# Patient Record
Sex: MALE | URBAN - METROPOLITAN AREA
[De-identification: names, ages, dates, MRNs, and addresses within clinical notes are randomized per-mention and may not be internally consistent; named-entity substitution may affect disease eponyms.]

---

## 2017-02-06 ENCOUNTER — IMPORTED ENCOUNTER (OUTPATIENT)
Dept: URBAN - METROPOLITAN AREA CLINIC 43 | Facility: CLINIC | Age: 57
End: 2017-02-06

## 2018-04-26 ENCOUNTER — IMPORTED ENCOUNTER (OUTPATIENT)
Dept: URBAN - METROPOLITAN AREA CLINIC 43 | Facility: CLINIC | Age: 58
End: 2018-04-26

## 2018-04-26 PROBLEM — H25.13: Noted: 2018-04-26

## 2018-04-26 PROBLEM — H40.013: Noted: 2018-04-26

## 2018-11-08 NOTE — PATIENT DISCUSSION
The risks, benefits, and alternatives to treatment with Botox® were discussed. The patient elects to proceed.

## 2018-11-08 NOTE — PROCEDURE NOTE: CLINICAL
Prior to treatment, the risks/benefits/alternatives were discussed. The patient wished to proceed with procedure. Refer to template for location and amounts of injections. Botox was injected at each site without complications. Lot #: . LOT. Expiration Date: . EXP. Inventory Id: . INVID. Total Units Used: *. Total Units Wasted: *. Patient tolerated procedure well. There were no complications. Post procedure instructions given.

## 2019-05-22 ENCOUNTER — IMPORTED ENCOUNTER (OUTPATIENT)
Dept: URBAN - METROPOLITAN AREA CLINIC 43 | Facility: CLINIC | Age: 59
End: 2019-05-22

## 2019-05-22 PROBLEM — H25.13: Noted: 2019-05-22

## 2020-01-14 ENCOUNTER — EST. PATIENT EMERGENCY (OUTPATIENT)
Dept: URBAN - METROPOLITAN AREA CLINIC 32 | Facility: CLINIC | Age: 60
End: 2020-01-14

## 2020-01-14 DIAGNOSIS — H10.89: ICD-10-CM

## 2020-01-14 PROCEDURE — 92012 INTRM OPH EXAM EST PATIENT: CPT

## 2020-01-14 ASSESSMENT — VISUAL ACUITY
OS_CC: 20/20-2
OD_CC: 20/20

## 2020-01-14 ASSESSMENT — TONOMETRY
OS_IOP_MMHG: 11
OD_IOP_MMHG: 11

## 2020-01-21 ENCOUNTER — IOP CHECK (OUTPATIENT)
Dept: URBAN - METROPOLITAN AREA CLINIC 32 | Facility: CLINIC | Age: 60
End: 2020-01-21

## 2020-01-21 DIAGNOSIS — H20.012: ICD-10-CM

## 2020-01-21 PROCEDURE — 92012 INTRM OPH EXAM EST PATIENT: CPT

## 2020-01-21 ASSESSMENT — VISUAL ACUITY
OS_CC: 20/25
OD_CC: 20/25

## 2020-01-21 ASSESSMENT — TONOMETRY
OS_IOP_MMHG: 10
OD_IOP_MMHG: 12

## 2020-01-27 ENCOUNTER — FOLLOW UP (OUTPATIENT)
Dept: URBAN - METROPOLITAN AREA CLINIC 32 | Facility: CLINIC | Age: 60
End: 2020-01-27

## 2020-01-27 DIAGNOSIS — H20.012: ICD-10-CM

## 2020-01-27 PROCEDURE — 92012 INTRM OPH EXAM EST PATIENT: CPT

## 2020-01-27 ASSESSMENT — VISUAL ACUITY
OD_SC: 20/60
OS_SC: 20/30-1
OD_PH: 20/25
OS_PH: 20/25

## 2020-01-27 ASSESSMENT — TONOMETRY: OS_IOP_MMHG: 12

## 2020-02-03 ENCOUNTER — FOLLOW UP (OUTPATIENT)
Dept: URBAN - METROPOLITAN AREA CLINIC 32 | Facility: CLINIC | Age: 60
End: 2020-02-03

## 2020-02-03 DIAGNOSIS — H20.012: ICD-10-CM

## 2020-02-03 PROCEDURE — 99212 OFFICE O/P EST SF 10 MIN: CPT

## 2020-02-03 ASSESSMENT — VISUAL ACUITY
OD_PH: 20/30
OD_SC: 20/50
OS_SC: 20/30-2

## 2020-02-03 ASSESSMENT — TONOMETRY
OS_IOP_MMHG: 08
OD_IOP_MMHG: 11

## 2020-02-20 ENCOUNTER — FOLLOW UP (OUTPATIENT)
Dept: URBAN - METROPOLITAN AREA CLINIC 32 | Facility: CLINIC | Age: 60
End: 2020-02-20

## 2020-02-20 DIAGNOSIS — H20.012: ICD-10-CM

## 2020-02-20 PROCEDURE — 99212 OFFICE O/P EST SF 10 MIN: CPT

## 2020-02-20 ASSESSMENT — VISUAL ACUITY
OD_SC: 20/60
OS_SC: 20/50-1
OD_PH: 20/25
OS_PH: 20/25

## 2020-02-20 ASSESSMENT — TONOMETRY
OD_IOP_MMHG: 09
OS_IOP_MMHG: 08

## 2020-04-19 ASSESSMENT — TONOMETRY
OS_IOP_MMHG: 9.0
OS_IOP_MMHG: 10.0
OD_IOP_MMHG: 10.0
OD_IOP_MMHG: 14.0

## 2020-04-19 ASSESSMENT — VISUAL ACUITY
OD_OTHER: 20/50.
OS_CC: 20/20
OD_CC: J1+
OD_OTHER: 20/40.
OS_SC: J10
OD_CC: 20/20
OS_OTHER: 20/50.
OD_SC: J10
OD_SC: 20/60
OS_OTHER: 20/40.
OS_SC: 20/60
OS_CC: J1+

## 2020-04-19 ASSESSMENT — KERATOMETRY
OD_AXISANGLE_DEGREES: 54
OD_AXISANGLE_DEGREES: 72
OD_K1POWER_DIOPTERS: 44.5
OD_K2POWER_DIOPTERS: 44.75
OD_K2POWER_DIOPTERS: 45.25
OD_AXISANGLE2_DEGREES: 162
OS_AXISANGLE2_DEGREES: 28
OD_K1POWER_DIOPTERS: 44.75
OS_AXISANGLE2_DEGREES: 8
OS_K2POWER_DIOPTERS: 44.5
OD_AXISANGLE2_DEGREES: 144
OS_AXISANGLE_DEGREES: 118
OS_AXISANGLE_DEGREES: 98
OS_K1POWER_DIOPTERS: 44.25
OS_K2POWER_DIOPTERS: 45
OS_K1POWER_DIOPTERS: 44.5

## 2020-07-06 ENCOUNTER — CONTACT LENS EXAM ESTABLISHED (OUTPATIENT)
Dept: URBAN - METROPOLITAN AREA CLINIC 32 | Facility: CLINIC | Age: 60
End: 2020-07-06

## 2020-07-06 DIAGNOSIS — H25.13: ICD-10-CM

## 2020-07-06 PROCEDURE — 92310-1 LEVEL 1 CONTACT LENS MANAGEMENT

## 2020-07-06 PROCEDURE — 92014 COMPRE OPH EXAM EST PT 1/>: CPT

## 2020-07-06 ASSESSMENT — VISUAL ACUITY
OS_GLARE: 20/50
OD_SC: J10
OS_CC: J1+
OS_SC: J10
OS_SC: 20/60
OS_CC: 20/20
OD_GLARE: 20/50
OD_CC: J1+
OD_CC: 20/20
OD_SC: 20/60

## 2020-07-06 ASSESSMENT — KERATOMETRY
OS_AXISANGLE2_DEGREES: 124
OS_K2POWER_DIOPTERS: 44.75
OD_K2POWER_DIOPTERS: 45
OS_AXISANGLE_DEGREES: 34
OS_K1POWER_DIOPTERS: 44.5
OD_AXISANGLE2_DEGREES: 74
OD_AXISANGLE_DEGREES: 164
OD_K1POWER_DIOPTERS: 44.75

## 2020-07-06 ASSESSMENT — TONOMETRY
OD_IOP_MMHG: 10
OS_IOP_MMHG: 10

## 2021-06-08 ENCOUNTER — EST. PATIENT EMERGENCY (OUTPATIENT)
Dept: URBAN - METROPOLITAN AREA CLINIC 32 | Facility: CLINIC | Age: 61
End: 2021-06-08

## 2021-06-08 DIAGNOSIS — H20.022: ICD-10-CM

## 2021-06-08 PROCEDURE — 92012 INTRM OPH EXAM EST PATIENT: CPT

## 2021-06-08 RX ORDER — CYCLOPENTOLATE HYDROCHLORIDE 10 MG/ML
1 SOLUTION/ DROPS OPHTHALMIC TWICE A DAY
Start: 2020-07-06

## 2021-06-08 ASSESSMENT — KERATOMETRY
OS_K1POWER_DIOPTERS: 44.5
OD_AXISANGLE_DEGREES: 164
OD_K2POWER_DIOPTERS: 45
OS_AXISANGLE_DEGREES: 34
OD_AXISANGLE2_DEGREES: 74
OS_AXISANGLE2_DEGREES: 124
OS_K2POWER_DIOPTERS: 44.75
OD_K1POWER_DIOPTERS: 44.75

## 2021-06-08 ASSESSMENT — VISUAL ACUITY
OD_SC: 20/50
OS_SC: 20/50

## 2021-06-08 ASSESSMENT — TONOMETRY
OS_IOP_MMHG: 10
OD_IOP_MMHG: 12

## 2021-06-15 ENCOUNTER — IOP CHECK (OUTPATIENT)
Dept: URBAN - METROPOLITAN AREA CLINIC 32 | Facility: CLINIC | Age: 61
End: 2021-06-15

## 2021-06-15 DIAGNOSIS — H20.022: ICD-10-CM

## 2021-06-15 PROCEDURE — 99212 OFFICE O/P EST SF 10 MIN: CPT

## 2021-06-15 ASSESSMENT — VISUAL ACUITY
OD_PH: 20/25
OS_PH: 20/25-2
OD_SC: 20/50-2
OS_SC: 20/50-2

## 2021-06-15 ASSESSMENT — TONOMETRY
OD_IOP_MMHG: 12
OS_IOP_MMHG: 14

## 2021-06-15 ASSESSMENT — KERATOMETRY
OD_AXISANGLE2_DEGREES: 74
OS_AXISANGLE_DEGREES: 34
OS_K2POWER_DIOPTERS: 44.75
OD_K2POWER_DIOPTERS: 45
OD_AXISANGLE_DEGREES: 164
OS_K1POWER_DIOPTERS: 44.5
OS_AXISANGLE2_DEGREES: 124
OD_K1POWER_DIOPTERS: 44.75

## 2021-07-15 ENCOUNTER — CONTACT LENS EXAM ESTABLISHED (OUTPATIENT)
Dept: URBAN - METROPOLITAN AREA CLINIC 32 | Facility: CLINIC | Age: 61
End: 2021-07-15

## 2021-07-15 DIAGNOSIS — H25.13: ICD-10-CM

## 2021-07-15 DIAGNOSIS — H52.03: ICD-10-CM

## 2021-07-15 PROCEDURE — 92310-3N NEW CL PATIENT MULTIFOCAL AND/OR MONOVISION SOFT LENS EVALUATION

## 2021-07-15 PROCEDURE — 92015 DETERMINE REFRACTIVE STATE: CPT

## 2021-07-15 PROCEDURE — 92014 COMPRE OPH EXAM EST PT 1/>: CPT

## 2021-07-15 ASSESSMENT — KERATOMETRY
OS_K1POWER_DIOPTERS: 44.5
OS_AXISANGLE_DEGREES: 34
OD_K2POWER_DIOPTERS: 45
OS_AXISANGLE2_DEGREES: 124
OD_AXISANGLE2_DEGREES: 74
OD_AXISANGLE_DEGREES: 164
OS_K2POWER_DIOPTERS: 44.75
OD_K1POWER_DIOPTERS: 44.75

## 2021-07-15 ASSESSMENT — TONOMETRY
OD_IOP_MMHG: 11
OS_IOP_MMHG: 09

## 2021-07-15 ASSESSMENT — VISUAL ACUITY
OS_CC: 20/25
OS_CC: J1
OS_BAT: 20/50
OD_SC: 20/50
OD_BAT: 20/50
OD_CC: J1
OD_SC: J10
OD_CC: 20/25
OS_SC: 20/50
OS_SC: J10

## 2022-06-04 ENCOUNTER — TELEPHONE ENCOUNTER (OUTPATIENT)
Dept: URBAN - METROPOLITAN AREA CLINIC 68 | Facility: CLINIC | Age: 62
End: 2022-06-04

## 2022-06-04 RX ORDER — HYDROCORTISONE ACETATE 25 MG/1
SUPPOSITORY RECTAL
Qty: 60 | Refills: 0 | OUTPATIENT
Start: 2017-01-05 | End: 2017-01-26

## 2022-06-04 RX ORDER — BUDESONIDE 9 MG/1
TABLET, EXTENDED RELEASE ORAL DAILY
Qty: 30 | Refills: 0 | OUTPATIENT
Start: 2016-09-21 | End: 2016-10-21

## 2022-06-04 RX ORDER — MERCAPTOPURINE 50 MG/1
TABLET ORAL DAILY
Qty: 30 | Refills: 30 | OUTPATIENT
Start: 2016-12-27 | End: 2017-01-26

## 2022-06-04 RX ORDER — MESALAMINE 4 G/60ML
SUSPENSION RECTAL AS DIRECTED
Qty: 30 | Refills: 30 | OUTPATIENT
Start: 2016-06-06 | End: 2016-09-23

## 2022-06-04 RX ORDER — DIPHENOXYLATE HCL/ATROPINE 2.5-.025MG
TABLET ORAL
Qty: 60 | Refills: 0 | OUTPATIENT
Start: 2017-11-07 | End: 2017-12-07

## 2022-06-04 RX ORDER — MESALAMINE 1000 MG/1
SUPPOSITORY RECTAL DAILY
Qty: 30 | Refills: 30 | OUTPATIENT
Start: 2016-10-27 | End: 2017-11-06

## 2022-06-04 RX ORDER — BUDESONIDE 9 MG/1
TABLET, EXTENDED RELEASE ORAL DAILY
Qty: 90 | Refills: 0 | OUTPATIENT
Start: 2017-05-26 | End: 2017-08-24

## 2022-06-04 RX ORDER — PREDNISONE 20 MG/1
TABLET ORAL AS DIRECTED
Qty: 90 | Refills: 0 | OUTPATIENT
Start: 2017-01-12 | End: 2017-02-11

## 2022-06-04 RX ORDER — MESALAMINE 1.2 G/1
TABLET, DELAYED RELEASE ORAL DAILY
Qty: 60 | Refills: 60 | OUTPATIENT
Start: 2016-05-03 | End: 2016-07-06

## 2022-06-04 RX ORDER — IMMUNE GLOB/PLASMA FRA BOVINE 5 G
POWDER IN PACKET (EA) ORAL
Qty: 60 | Refills: 0 | OUTPATIENT
Start: 2017-11-06 | End: 2017-12-06

## 2022-06-04 RX ORDER — MESALAMINE 1000 MG/1
SUPPOSITORY RECTAL
Qty: 30 | Refills: 0 | OUTPATIENT
Start: 2016-05-10 | End: 2016-06-09

## 2022-06-04 RX ORDER — BUDESONIDE 9 MG/1
UCERIS( 9MG ORAL  DAILY ) INACTIVE -HX ENTRY TABLET, EXTENDED RELEASE ORAL DAILY
OUTPATIENT
Start: 2016-11-29

## 2022-06-04 RX ORDER — MESALAMINE 1000 MG/1
SUPPOSITORY RECTAL AT BEDTIME
Qty: 30 | Refills: 30 | OUTPATIENT
Start: 2017-05-11 | End: 2017-11-06

## 2022-06-04 RX ORDER — PREDNISONE 10 MG/1
TABLET ORAL AS DIRECTED
Qty: 180 | Refills: 0 | OUTPATIENT
Start: 2017-01-26 | End: 2017-03-27

## 2022-06-05 ENCOUNTER — TELEPHONE ENCOUNTER (OUTPATIENT)
Dept: URBAN - METROPOLITAN AREA CLINIC 68 | Facility: CLINIC | Age: 62
End: 2022-06-05

## 2022-06-05 RX ORDER — HYDROCORTISONE 100 MG/60ML
ENEMA RECTAL DAILY
Qty: 14 | Refills: 0 | Status: ACTIVE | COMMUNITY
Start: 2017-02-16

## 2022-06-05 RX ORDER — SIMETHICONE 250 MG/1
CAPSULE, GELATIN COATED ORAL
Qty: 60 | Refills: 60 | Status: ACTIVE | COMMUNITY
Start: 2017-02-16

## 2022-06-05 RX ORDER — BUDESONIDE 28 MG/1
AEROSOL, FOAM RECTAL AS DIRECTED
Qty: 90 | Refills: 90 | Status: ACTIVE | COMMUNITY
Start: 2017-09-26

## 2022-06-05 RX ORDER — LACTOBACIL 2/BIFIDO 1/S.THERMO 450B CELL
PACKET (EA) ORAL AS DIRECTED
Qty: 120 | Refills: 120 | Status: ACTIVE | COMMUNITY
Start: 2017-04-10

## 2022-06-05 RX ORDER — HYDROCORTISONE 100 MG/60ML
ENEMA RECTAL DAILY
Qty: 14 | Refills: 0 | Status: ACTIVE | COMMUNITY
Start: 2017-02-23

## 2022-06-05 RX ORDER — FERROUS SULFATE TAB EC 325 MG (65 MG FE EQUIVALENT) 325 (65 FE) MG
TABLET DELAYED RESPONSE ORAL DAILY
Qty: 30 | Refills: 30 | Status: ACTIVE | COMMUNITY
Start: 2017-11-15

## 2022-06-05 RX ORDER — POLYETHYLENE GLYCOL 3350 17 G/DOSE
MIRALAX(  ORAL 17G IN 8 OUNCE  FLDS DAILY ) ACTIVE -HX ENTRY POWDER (GRAM) ORAL
Status: ACTIVE | COMMUNITY
Start: 2017-11-15

## 2022-06-05 RX ORDER — MESALAMINE 375 MG/1
CAPSULE, EXTENDED RELEASE ORAL DAILY
Qty: 120 | Refills: 120 | Status: ACTIVE | COMMUNITY
Start: 2016-08-09

## 2022-06-25 ENCOUNTER — TELEPHONE ENCOUNTER (OUTPATIENT)
Age: 62
End: 2022-06-25

## 2022-06-25 RX ORDER — BUDESONIDE 9 MG/1
TABLET, EXTENDED RELEASE ORAL DAILY
Qty: 30 | Refills: 0 | OUTPATIENT
Start: 2016-09-21 | End: 2016-10-21

## 2022-06-25 RX ORDER — DIPHENOXYLATE HCL/ATROPINE 2.5-.025MG
TABLET ORAL
Qty: 60 | Refills: 0 | OUTPATIENT
Start: 2017-11-07 | End: 2017-12-07

## 2022-06-25 RX ORDER — BUDESONIDE 9 MG/1
UCERIS( 9MG ORAL  DAILY ) INACTIVE -HX ENTRY TABLET, EXTENDED RELEASE ORAL DAILY
OUTPATIENT
Start: 2016-11-29

## 2022-06-25 RX ORDER — MESALAMINE 4 G/60ML
SUSPENSION RECTAL AS DIRECTED
Qty: 30 | Refills: 30 | OUTPATIENT
Start: 2016-06-06 | End: 2016-09-23

## 2022-06-25 RX ORDER — BUDESONIDE 9 MG/1
TABLET, EXTENDED RELEASE ORAL DAILY
Qty: 90 | Refills: 0 | OUTPATIENT
Start: 2017-05-26 | End: 2017-08-24

## 2022-06-25 RX ORDER — MESALAMINE 1000 MG/1
SUPPOSITORY RECTAL AT BEDTIME
Qty: 30 | Refills: 30 | OUTPATIENT
Start: 2017-05-11 | End: 2017-11-06

## 2022-06-25 RX ORDER — SODIUM SULFATE, POTASSIUM SULFATE, MAGNESIUM SULFATE 17.5; 3.13; 1.6 G/ML; G/ML; G/ML
SOLUTION, CONCENTRATE ORAL AS DIRECTED
Qty: 1 | Refills: 0 | OUTPATIENT
Start: 2016-05-03 | End: 2016-05-04

## 2022-06-25 RX ORDER — IMMUNE GLOB/PLASMA FRA BOVINE 5 G
POWDER IN PACKET (EA) ORAL
Qty: 60 | Refills: 0 | OUTPATIENT
Start: 2017-11-06 | End: 2017-12-06

## 2022-06-25 RX ORDER — PREDNISONE 10 MG/1
TABLET ORAL AS DIRECTED
Qty: 180 | Refills: 0 | OUTPATIENT
Start: 2017-01-26 | End: 2017-03-27

## 2022-06-25 RX ORDER — MERCAPTOPURINE 50 MG/1
TABLET ORAL DAILY
Qty: 30 | Refills: 30 | OUTPATIENT
Start: 2016-12-27 | End: 2017-01-26

## 2022-06-25 RX ORDER — MESALAMINE 1000 MG/1
SUPPOSITORY RECTAL DAILY
Qty: 30 | Refills: 30 | OUTPATIENT
Start: 2016-10-27 | End: 2017-11-06

## 2022-06-25 RX ORDER — BUDESONIDE 9 MG/1
TABLET, EXTENDED RELEASE ORAL DAILY
Qty: 90 | Refills: 90 | OUTPATIENT
Start: 2016-05-03 | End: 2016-07-06

## 2022-06-25 RX ORDER — PREDNISONE 20 MG/1
TABLET ORAL AS DIRECTED
Qty: 90 | Refills: 0 | OUTPATIENT
Start: 2017-01-12 | End: 2017-02-11

## 2022-06-25 RX ORDER — HYDROCORTISONE ACETATE 25 MG/1
SUPPOSITORY RECTAL
Qty: 60 | Refills: 0 | OUTPATIENT
Start: 2017-01-05 | End: 2017-01-26

## 2022-06-25 RX ORDER — MESALAMINE 1.2 G/1
TABLET, DELAYED RELEASE ORAL DAILY
Qty: 60 | Refills: 60 | OUTPATIENT
Start: 2016-05-03 | End: 2016-07-06

## 2022-06-25 RX ORDER — MESALAMINE 1000 MG/1
SUPPOSITORY RECTAL
Qty: 30 | Refills: 0 | OUTPATIENT
Start: 2016-05-10 | End: 2016-06-09

## 2022-06-25 RX ORDER — POLYETHYLENE GLYCOL 3350, SODIUM SULFATE, SODIUM CHLORIDE, POTASSIUM CHLORIDE, ASCORBIC ACID, SODIUM ASCORBATE 7.5-2.691G
MOVIPREP(    AS DIRECTED ) INACTIVE -HX ENTRY KIT ORAL AS DIRECTED
OUTPATIENT
Start: 2010-06-09

## 2022-06-26 ENCOUNTER — TELEPHONE ENCOUNTER (OUTPATIENT)
Age: 62
End: 2022-06-26

## 2022-06-26 RX ORDER — LORATADINE 5 MG
MIRALAX(  ORAL 17G IN 8 OUNCE  FLDS DAILY ) ACTIVE -HX ENTRY TABLET,CHEWABLE ORAL
Status: ACTIVE | COMMUNITY
Start: 2017-11-15

## 2022-06-26 RX ORDER — LACTOBACIL 2/BIFIDO 1/S.THERMO 450B CELL
PACKET (EA) ORAL AS DIRECTED
Qty: 120 | Refills: 120 | Status: ACTIVE | COMMUNITY
Start: 2017-04-10

## 2022-06-26 RX ORDER — HYDROCORTISONE 100 MG/60ML
ENEMA RECTAL DAILY
Qty: 14 | Refills: 0 | Status: ACTIVE | COMMUNITY
Start: 2017-02-16

## 2022-06-26 RX ORDER — SIMETHICONE 250 MG/1
CAPSULE, GELATIN COATED ORAL
Qty: 60 | Refills: 60 | Status: ACTIVE | COMMUNITY
Start: 2017-02-16

## 2022-06-26 RX ORDER — DICYCLOMINE HYDROCHLORIDE 20 MG/2ML
INJECTION, SOLUTION INTRAMUSCULAR
Qty: 30 | Refills: 30 | Status: ACTIVE | COMMUNITY
Start: 2018-01-30

## 2022-06-26 RX ORDER — MESALAMINE 375 MG/1
CAPSULE, EXTENDED RELEASE ORAL DAILY
Qty: 120 | Refills: 120 | Status: ACTIVE | COMMUNITY
Start: 2016-08-09

## 2022-06-26 RX ORDER — BUDESONIDE 28 MG/1
AEROSOL, FOAM RECTAL AS DIRECTED
Qty: 90 | Refills: 90 | Status: ACTIVE | COMMUNITY
Start: 2017-09-26

## 2022-06-26 RX ORDER — FERROUS SULFATE TAB EC 325 MG (65 MG FE EQUIVALENT) 325 (65 FE) MG
TABLET DELAYED RESPONSE ORAL DAILY
Qty: 30 | Refills: 30 | Status: ACTIVE | COMMUNITY
Start: 2017-11-15

## 2022-06-26 RX ORDER — HYDROCORTISONE 100 MG/60ML
ENEMA RECTAL DAILY
Qty: 14 | Refills: 0 | Status: ACTIVE | COMMUNITY
Start: 2017-02-23

## 2023-05-15 ENCOUNTER — COMPREHENSIVE EXAM (OUTPATIENT)
Dept: URBAN - METROPOLITAN AREA CLINIC 32 | Facility: CLINIC | Age: 63
End: 2023-05-15

## 2023-05-15 DIAGNOSIS — H25.13: ICD-10-CM

## 2023-05-15 PROCEDURE — 92015 DETERMINE REFRACTIVE STATE: CPT

## 2023-05-15 PROCEDURE — 92310-1 LEVEL 1 CONTACT LENS MANAGEMENT

## 2023-05-15 PROCEDURE — 92014 COMPRE OPH EXAM EST PT 1/>: CPT

## 2023-05-15 ASSESSMENT — VISUAL ACUITY
OS_CC: 20/25
OD_GLARE: 20/50
OD_CC: 20/25
OS_SC: 20/50
OS_GLARE: 20/40
OD_SC: J10
OD_CC: J1+
OD_SC: 20/50
OS_SC: J10
OS_CC: J1+

## 2023-05-15 ASSESSMENT — KERATOMETRY
OD_K1POWER_DIOPTERS: 44.75
OD_K2POWER_DIOPTERS: 45
OS_K2POWER_DIOPTERS: 44.75
OD_AXISANGLE2_DEGREES: 74
OS_AXISANGLE_DEGREES: 34
OD_AXISANGLE_DEGREES: 164
OS_AXISANGLE2_DEGREES: 124
OS_K1POWER_DIOPTERS: 44.5

## 2023-05-15 ASSESSMENT — TONOMETRY
OD_IOP_MMHG: 10
OS_IOP_MMHG: 12

## 2025-01-13 ENCOUNTER — COMPREHENSIVE EXAM (OUTPATIENT)
Age: 65
End: 2025-01-13

## 2025-01-13 DIAGNOSIS — H16.223: ICD-10-CM

## 2025-01-13 DIAGNOSIS — H25.13: ICD-10-CM

## 2025-01-13 PROCEDURE — 92310-1 LEVEL 1 SOFT LENS UPDATE

## 2025-01-13 PROCEDURE — 99214 OFFICE O/P EST MOD 30 MIN: CPT
